# Patient Record
Sex: MALE | Race: BLACK OR AFRICAN AMERICAN | NOT HISPANIC OR LATINO | Employment: FULL TIME | URBAN - METROPOLITAN AREA
[De-identification: names, ages, dates, MRNs, and addresses within clinical notes are randomized per-mention and may not be internally consistent; named-entity substitution may affect disease eponyms.]

---

## 2020-09-08 ENCOUNTER — OCCUPATIONAL HEALTH (OUTPATIENT)
Dept: URGENT CARE | Facility: CLINIC | Age: 20
End: 2020-09-08

## 2020-09-08 DIAGNOSIS — Z02.1 PRE-EMPLOYMENT EXAMINATION: Primary | ICD-10-CM

## 2020-09-08 LAB
CTP QC/QA: YES
POC 10 PANEL DRUG SCREEN: NEGATIVE

## 2020-09-08 PROCEDURE — 80305 POCT RAPID DRUG SCREEN 10 PANEL: ICD-10-PCS | Mod: QW,S$GLB,, | Performed by: FAMILY MEDICINE

## 2020-09-08 PROCEDURE — 99499 UNLISTED E&M SERVICE: CPT | Mod: S$GLB,,, | Performed by: FAMILY MEDICINE

## 2020-09-08 PROCEDURE — 80305 DRUG TEST PRSMV DIR OPT OBS: CPT | Mod: QW,S$GLB,, | Performed by: FAMILY MEDICINE

## 2020-09-08 PROCEDURE — 97750 LIFT TEST: ICD-10-PCS | Mod: S$GLB,,, | Performed by: FAMILY MEDICINE

## 2020-09-08 PROCEDURE — 97750 PHYSICAL PERFORMANCE TEST: CPT | Mod: S$GLB,,, | Performed by: FAMILY MEDICINE

## 2020-09-08 PROCEDURE — 99499 PHYSICAL, BASIC COMPLEXITY: ICD-10-PCS | Mod: S$GLB,,, | Performed by: FAMILY MEDICINE

## 2022-04-08 ENCOUNTER — OFFICE VISIT (OUTPATIENT)
Dept: URGENT CARE | Facility: CLINIC | Age: 22
End: 2022-04-08
Payer: COMMERCIAL

## 2022-04-08 DIAGNOSIS — M79.672 LEFT FOOT PAIN: ICD-10-CM

## 2022-04-08 DIAGNOSIS — Z02.6 ENCOUNTER RELATED TO WORKER'S COMPENSATION CLAIM: Primary | ICD-10-CM

## 2022-04-08 DIAGNOSIS — S97.82XA CRUSHING INJURY OF LEFT FOOT, INITIAL ENCOUNTER: ICD-10-CM

## 2022-04-08 LAB
BREATH ALCOHOL: 0
CTP QC/QA: YES
POC 10 PANEL DRUG SCREEN: NEGATIVE

## 2022-04-08 PROCEDURE — 99203 OFFICE O/P NEW LOW 30 MIN: CPT | Mod: S$GLB,,, | Performed by: FAMILY MEDICINE

## 2022-04-08 PROCEDURE — 80305 DRUG TEST PRSMV DIR OPT OBS: CPT | Mod: S$GLB,,, | Performed by: FAMILY MEDICINE

## 2022-04-08 PROCEDURE — 82075 POCT BREATH ALCOHOL TEST: ICD-10-PCS | Mod: S$GLB,,, | Performed by: FAMILY MEDICINE

## 2022-04-08 PROCEDURE — 99203 PR OFFICE/OUTPT VISIT, NEW, LEVL III, 30-44 MIN: ICD-10-PCS | Mod: S$GLB,,, | Performed by: FAMILY MEDICINE

## 2022-04-08 PROCEDURE — 73630 X-RAY EXAM OF FOOT: CPT | Mod: LT,S$GLB,, | Performed by: RADIOLOGY

## 2022-04-08 PROCEDURE — 82075 ASSAY OF BREATH ETHANOL: CPT | Mod: S$GLB,,, | Performed by: FAMILY MEDICINE

## 2022-04-08 PROCEDURE — 80305 POCT RAPID DRUG SCREEN 10 PANEL: ICD-10-PCS | Mod: S$GLB,,, | Performed by: FAMILY MEDICINE

## 2022-04-08 PROCEDURE — 73630 XR FOOT COMPLETE 3 VIEW LEFT: ICD-10-PCS | Mod: LT,S$GLB,, | Performed by: RADIOLOGY

## 2022-04-08 RX ORDER — NAPROXEN 500 MG/1
500 TABLET ORAL 2 TIMES DAILY WITH MEALS
Qty: 30 TABLET | Refills: 0 | Status: SHIPPED | OUTPATIENT
Start: 2022-04-08 | End: 2023-04-08

## 2022-04-08 NOTE — LETTER
[facility]  [address], [address]  [place] [address]  Phone: [phone]  Fax: [phone]  [facility]: [id number]    Pt Name: Jessica Escobedo  Injury Date: 04/07/2022   Employee ID:[id number] Date of  Treatment: 04/08/2022   Company:  [employer]      Appointment Time: 08:20 AM Arrived: 820   Provider: Bartolo Jackman MD Time Out:920     Office Treatment:   1. Encounter related to worker's compensation claim    2. Left foot pain    3. Crushing injury of left foot, initial encounter      Medications Ordered This Encounter   Medications    naproxen (NAPROSYN) 500 MG tablet      Patient Instructions: Attention not to aggravate affected area (Ice and elevate)    Restrictions: Avoid climbing/kneeling/squatting, No Prolonged standing/walking, No lifting/pushing/pulling more than 25 lbs (may progress as tolerated.)     Return Appointment: 4/21 or sooner if needed

## 2022-04-08 NOTE — PROGRESS NOTES
Subjective:       Patient ID: Jessica Escobedo is a 22 y.o. male.    Chief Complaint: Foot Injury and Foot Pain    Pt presents to urgent care for a w/c initial visit. He works for [employer].  He states that yesterday while talking on the phone he rolled a zakia over his left foot.  He has tried ibuprofen OTC with no relief.  DOI 4/7/2022.  Pain scale- 7.    Foot Injury   The incident occurred 12 to 24 hours ago. The incident occurred at work. There was no injury mechanism. The pain is present in the left foot. The pain is at a severity of 7/10. The pain is severe. The pain has been intermittent since onset. He reports no foreign bodies present. Nothing aggravates the symptoms. He has tried NSAIDs for the symptoms. The treatment provided mild relief.   Foot Pain      ROS     Objective:      Physical Exam  Musculoskeletal:         General: Tenderness and signs of injury present. Normal range of motion.      Left foot: Swelling, tenderness and bony tenderness present.        Feet:              Assessment:       1. Encounter related to worker's compensation claim    2. Left foot pain    3. Crushing injury of left foot, initial encounter        Plan:         Medications Ordered This Encounter   Medications    naproxen (NAPROSYN) 500 MG tablet     Sig: Take 1 tablet (500 mg total) by mouth 2 (two) times daily with meals.     Dispense:  30 tablet     Refill:  0     Patient Instructions: Attention not to aggravate affected area (Ice and elevate)   Restrictions: Avoid climbing/kneeling/squatting, No Prolonged standing/walking, No lifting/pushing/pulling more than 25 lbs (may progress as tolerated.)  No follow-ups on file.

## 2022-04-21 ENCOUNTER — OFFICE VISIT (OUTPATIENT)
Dept: URGENT CARE | Facility: CLINIC | Age: 22
End: 2022-04-21
Payer: COMMERCIAL

## 2022-04-21 VITALS
HEART RATE: 65 BPM | TEMPERATURE: 98 F | WEIGHT: 195 LBS | OXYGEN SATURATION: 98 % | BODY MASS INDEX: 28.88 KG/M2 | DIASTOLIC BLOOD PRESSURE: 85 MMHG | RESPIRATION RATE: 16 BRPM | HEIGHT: 69 IN | SYSTOLIC BLOOD PRESSURE: 123 MMHG

## 2022-04-21 DIAGNOSIS — S97.82XD CRUSHING INJURY OF LEFT FOOT, SUBSEQUENT ENCOUNTER: Primary | ICD-10-CM

## 2022-04-21 PROCEDURE — 99214 PR OFFICE/OUTPT VISIT, EST, LEVL IV, 30-39 MIN: ICD-10-PCS | Mod: S$GLB,,, | Performed by: FAMILY MEDICINE

## 2022-04-21 PROCEDURE — 99214 OFFICE O/P EST MOD 30 MIN: CPT | Mod: S$GLB,,, | Performed by: FAMILY MEDICINE

## 2022-04-21 NOTE — PROGRESS NOTES
Subjective:       Patient ID: Jessica Escobedo is a 22 y.o. male.    Chief Complaint: Foot Injury    4/8/2022- Pt presents to urgent care for a w/c initial visit. He works for [employer].  He states that yesterday while talking on the phone he rolled a zakia over his left foot.  He has tried ibuprofen OTC with no relief.  DOI 4/7/2022.  Pain scale- 7.          4/21/2022- Pt presents to urgent care for a w/c follow up.  He works for [employer].  He states that he still has pain in his left foot every once in a while, the pain is really there when he walks around.  He is taking the naproxen as directed with relief.  Pain scale- 6.    Foot Injury   The incident occurred more than 1 week ago. The incident occurred at work. There was no injury mechanism. The pain is present in the left foot. The pain is at a severity of 6/10. The pain is moderate. The pain has been intermittent since onset. He reports no foreign bodies present. Treatments tried: naproxen. The treatment provided no relief.     ROS     Objective:      Physical Exam  Musculoskeletal:         General: Tenderness and signs of injury present.        Feet:            Able to initiate flexion and extension but inc pain in great toe.     Assessment:       1. Crushing injury of left foot, subsequent encounter        Plan:     pt reports improvement in pain, but still limiting in ability to stand. Advised of expectant course. Return sooner if improved to return to full duty.        Patient Instructions: Daily home exercises/warm soaks   Restrictions:  (continue current work status.)  No follow-ups on file.

## 2022-04-21 NOTE — LETTER
[facility]  [address], [address]  [place] [address]  Phone: [phone]  Fax: [phone]  [facility]: [id number]    Pt Name: Jessica Escobedo  Injury Date: 04/07/2022   Employee ID: [id number] Date of  Treatment: 04/21/2022   Company: [employer]      Appointment Time: 08:15 AM Arrived: 800   Provider: Bartolo Jackman MD Time Out:815     Office Treatment:   1. Crushing injury of left foot, subsequent encounter          Patient Instructions: Daily home exercises/warm soaks    Restrictions:  (continue current work status.)     Return Appointment: 5/5 or sooner if needed

## 2022-04-25 VITALS
TEMPERATURE: 98 F | HEIGHT: 69 IN | RESPIRATION RATE: 16 BRPM | WEIGHT: 195 LBS | DIASTOLIC BLOOD PRESSURE: 83 MMHG | OXYGEN SATURATION: 98 % | HEART RATE: 60 BPM | BODY MASS INDEX: 28.88 KG/M2 | SYSTOLIC BLOOD PRESSURE: 124 MMHG

## 2022-05-05 ENCOUNTER — OFFICE VISIT (OUTPATIENT)
Dept: URGENT CARE | Facility: CLINIC | Age: 22
End: 2022-05-05
Payer: COMMERCIAL

## 2022-05-05 VITALS
WEIGHT: 165 LBS | HEIGHT: 69 IN | BODY MASS INDEX: 24.44 KG/M2 | RESPIRATION RATE: 16 BRPM | DIASTOLIC BLOOD PRESSURE: 66 MMHG | OXYGEN SATURATION: 99 % | SYSTOLIC BLOOD PRESSURE: 110 MMHG | TEMPERATURE: 98 F | HEART RATE: 56 BPM

## 2022-05-05 DIAGNOSIS — S97.82XD CRUSHING INJURY OF LEFT FOOT, SUBSEQUENT ENCOUNTER: Primary | ICD-10-CM

## 2022-05-05 DIAGNOSIS — M79.672 LEFT FOOT PAIN: ICD-10-CM

## 2022-05-05 PROCEDURE — 99214 OFFICE O/P EST MOD 30 MIN: CPT | Mod: S$GLB,,, | Performed by: FAMILY MEDICINE

## 2022-05-05 PROCEDURE — 99214 PR OFFICE/OUTPT VISIT, EST, LEVL IV, 30-39 MIN: ICD-10-PCS | Mod: S$GLB,,, | Performed by: FAMILY MEDICINE

## 2022-05-05 NOTE — PROGRESS NOTES
Subjective:       Patient ID: Jessica Escobedo is a 22 y.o. male.    Chief Complaint: Foot Pain    4/21/2022- Pt presents to urgent care for a w/c follow up.  He works for [employer].  He states that he still has pain in his left foot every once in a while, the pain is really there when he walks around.  He is taking the naproxen as directed with relief.  Pain scale- 6.       Patient's place of employment - [employer]  Patient's job title -    Date of Injury - 4/7/2022  Body part injured - left foot  Current work status per last visit - Avoid climbing/kneeling/squatting, No Prolonged standing/walking, No lifting/pushing/pulling more than 25 lbs (may progress as tolerated.)  Improved, same, or worse - Improved  Pt has been taking naproxen for pain, with some relief.  Pain scale 5/10        Foot Pain  This is a recurrent problem. The current episode started more than 1 month ago. The problem occurs constantly. The problem has been gradually improving. Associated symptoms include numbness. The symptoms are aggravated by walking and standing. He has tried NSAIDs for the symptoms. The treatment provided mild relief.       Neurological: Positive for numbness.        Objective:      Physical Exam    Improved swelling  Improved ROM  Pt able to single leg raise with minimal discomfort.   Assessment:       1. Crushing injury of left foot, subsequent encounter    2. Left foot pain        Plan:       Pt subjective pain level reported seemingly out of proportion to physical exam findings. He has asked specifically to return to work on 5/9.      Patient Instructions: Daily home exercises/warm soaks   Restrictions: Regular Duty, Discharged from Occupational Health  No follow-ups on file.

## 2022-05-05 NOTE — LETTER
[facility]  [address], [address]  [facility] [address]  Phone: [phone]  Fax: [phone]  [facility]: [id number]    Pt Name: Jessica Escobedo  Injury Date: 04/07/2022   Employee ID: [id number] Date of  Treatment: 05/05/2022   Company: [employer]      Appointment Time: 07:45 AM Arrived: 800   Provider: Bartolo Jackman MD Time Out:820     Office Treatment:   1. Crushing injury of left foot, subsequent encounter    2. Left foot pain          Patient Instructions: Daily home exercises/warm soaks    Restrictions: Regular Duty, Discharged from Occupational Health      Return to work 5/9

## 2023-08-03 ENCOUNTER — OFFICE VISIT (OUTPATIENT)
Dept: URGENT CARE | Facility: CLINIC | Age: 23
End: 2023-08-03
Payer: COMMERCIAL

## 2023-08-03 VITALS
DIASTOLIC BLOOD PRESSURE: 75 MMHG | SYSTOLIC BLOOD PRESSURE: 120 MMHG | TEMPERATURE: 98 F | RESPIRATION RATE: 16 BRPM | OXYGEN SATURATION: 98 % | HEART RATE: 50 BPM

## 2023-08-03 DIAGNOSIS — M25.512 ACUTE PAIN OF LEFT SHOULDER: Primary | ICD-10-CM

## 2023-08-03 DIAGNOSIS — Z02.6 ENCOUNTER RELATED TO WORKER'S COMPENSATION CLAIM: ICD-10-CM

## 2023-08-03 PROCEDURE — 73030 XR SHOULDER TRAUMA 3 VIEW LEFT: ICD-10-PCS | Mod: LT,S$GLB,, | Performed by: RADIOLOGY

## 2023-08-03 PROCEDURE — 99213 OFFICE O/P EST LOW 20 MIN: CPT | Mod: S$GLB,,, | Performed by: FAMILY MEDICINE

## 2023-08-03 PROCEDURE — 99213 PR OFFICE/OUTPT VISIT, EST, LEVL III, 20-29 MIN: ICD-10-PCS | Mod: S$GLB,,, | Performed by: FAMILY MEDICINE

## 2023-08-03 PROCEDURE — 73030 X-RAY EXAM OF SHOULDER: CPT | Mod: LT,S$GLB,, | Performed by: RADIOLOGY

## 2023-08-03 RX ORDER — NAPROXEN 500 MG/1
500 TABLET ORAL 2 TIMES DAILY
Qty: 30 TABLET | Refills: 1 | Status: SHIPPED | OUTPATIENT
Start: 2023-08-03

## 2023-08-03 RX ORDER — METHYLPREDNISOLONE 4 MG/1
TABLET ORAL
Qty: 21 EACH | Refills: 0 | Status: SHIPPED | OUTPATIENT
Start: 2023-08-03

## 2023-08-03 NOTE — LETTER
[facility] [place] [address], [address]  [facility] [address]  Phone: [phone]  Fax: [phone]  [facility]: [id number]    Pt Name: Jessica Escobedo  Injury Date: 07/31/2023   Employee ID:[id number] Date of Treatment: 08/03/2023   Company: [employer]       Appointment Time: 11:10 AM Arrived: 1130   Provider: Bartolo Jackman MD Time Out:1225     Office Treatment:   1. Acute pain of left shoulder    2. Encounter related to worker's compensation claim      Medications Ordered This Encounter   Medications    methylPREDNISolone (MEDROL DOSEPACK) 4 mg tablet    naproxen (NAPROSYN) 500 MG tablet      Patient Instructions: Attention not to aggravate affected area, Daily home exercises/warm soaks, Apply ice 24-48 hours then apply heat/warm soaks    Restrictions:  (light duty)     Return Appointment: 8/8 or sooner if needed

## 2023-08-03 NOTE — PROGRESS NOTES
Subjective:      Patient ID: Jessica Escobedo is a 23 y.o. male.    Chief Complaint: Shoulder Injury    WC Initial Visit.  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  Pt has cont to work but pain has increased.  Pt has good ROM but raining it causes pain.  Meds Ibuprofen, tylenol  Pt states that ibuprofen helps a lot but wears off after 3 hrs.          pain 8/10    Shoulder Injury   The incident occurred at work. The left shoulder is affected. The incident occurred more than 1 week ago. Injury mechanism: lifting. The pain does not radiate. The pain is at a severity of 8/10. The pain is severe. The symptoms are aggravated by overhead lifting and movement. He has tried ice and acetaminophen (ibuprofen) for the symptoms. The treatment provided moderate relief.     ROS  Objective:     Physical Exam  Musculoskeletal:         General: Tenderness present.      Left shoulder: Tenderness present. Decreased range of motion.        Arms:       Comments: Positive Neer's Positive Maciel positive empty can positive apprehension test  Decreased strength        Assessment:      1. Acute pain of left shoulder    2. Encounter related to worker's compensation claim      Plan:       Medications Ordered This Encounter   Medications    methylPREDNISolone (MEDROL DOSEPACK) 4 mg tablet     Sig: use as directed     Dispense:  21 each     Refill:  0    naproxen (NAPROSYN) 500 MG tablet     Sig: Take 1 tablet (500 mg total) by mouth 2 (two) times daily.     Dispense:  30 tablet     Refill:  1     Patient Instructions: Attention not to aggravate affected area, Daily home exercises/warm soaks, Apply ice 24-48 hours then apply heat/warm soaks   Restrictions:  (light duty)  No follow-ups on file.

## 2023-08-08 ENCOUNTER — OFFICE VISIT (OUTPATIENT)
Dept: URGENT CARE | Facility: CLINIC | Age: 23
End: 2023-08-08
Payer: COMMERCIAL

## 2023-08-08 VITALS
BODY MASS INDEX: 24.44 KG/M2 | TEMPERATURE: 98 F | WEIGHT: 165 LBS | OXYGEN SATURATION: 97 % | SYSTOLIC BLOOD PRESSURE: 149 MMHG | HEART RATE: 53 BPM | RESPIRATION RATE: 16 BRPM | DIASTOLIC BLOOD PRESSURE: 85 MMHG | HEIGHT: 69 IN

## 2023-08-08 DIAGNOSIS — Z02.6 ENCOUNTER RELATED TO WORKER'S COMPENSATION CLAIM: Primary | ICD-10-CM

## 2023-08-08 DIAGNOSIS — M25.512 ACUTE PAIN OF LEFT SHOULDER: ICD-10-CM

## 2023-08-08 PROCEDURE — 99213 OFFICE O/P EST LOW 20 MIN: CPT | Mod: S$GLB,,, | Performed by: FAMILY MEDICINE

## 2023-08-08 PROCEDURE — 99213 PR OFFICE/OUTPT VISIT, EST, LEVL III, 20-29 MIN: ICD-10-PCS | Mod: S$GLB,,, | Performed by: FAMILY MEDICINE

## 2023-08-08 NOTE — LETTER
[facility] [place] [address], [address]  [place] [address]  Phone: [phone]  Fax: [phone]  [facility]: [id number]    Pt Name: Jessica Escobedo  Injury Date: 07/31/2023   Employee ID:[id number] Date of Treatment: 08/08/2023   Company: [employer]      Appointment Time: 07:45 AM Arrived: 1015   Provider: Bartolo Jackman MD Time Out:1115     Office Treatment:   1. Encounter related to worker's compensation claim    2. Acute pain of left shoulder          Patient Instructions: Attention not to aggravate affected area, Daily home exercises/warm soaks    Restrictions:  (continue current restrictions- advance as tolerated.)     Return Appointment: 8/17 or sooner if needed     You have a work related injury. Medical care and treatment required as a result of a work-related injury  should be focused on restoring functional ability required to meet your daily and work activities and return to work, while striving to restore your health to pre-injury status in so far as is feasible.  If Rx a medication that can be sedating, DO NOT TAKE DURING YOUR WORK DAY.    Some OTC measures to help in recovery(if no allergies to, renal issues or pregnant):  Tylenol 325mg 3x per day  Take Pepcid 20mg BID  If applicable or discussed: Magnesium OTC daily; Topical Voltaren Gel; Lidocaine patches  Massage area if possible  Resting of the injured area  Ice for ankle, wrist or elbow injury  Elevation of the injured area if applicable  Heating pad for muscle injury  Stretching/ROM exercises as described in clinic.   ** BE ADVISED: You should be in regular contact with your W/C  to know the status of your claim and /or (if any)pending referrals**

## 2023-08-08 NOTE — PROGRESS NOTES
Subjective:      Patient ID: Jessica Escobedo is a 23 y.o. male.    Chief Complaint: Shoulder Pain    Pt presents to urgent care for a w/c follow up.  He works for [employer].  He states that his back is still hurting, but not until about 45 minutes after he picks something up.  His left shoulder hurts him in the morning when he wakes up.  Pain scale today- 6. He has been taking ibuprofen for pain with mild relief.         8/3/2023-  Initial Visit.  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  Pt has cont to work but pain has increased.  Pt has good ROM but raining it causes pain.  Meds Ibuprofen, tylenol  Pt states that ibuprofen helps a lot but wears off after 3 hrs.          pain 8/10    Shoulder Pain   The pain is present in the left shoulder. This is a new problem. The current episode started in the past 7 days. There has been a history of trauma. The problem occurs constantly. The problem has been unchanged. The pain is at a severity of 6/10. The pain is moderate. He has tried NSAIDS for the symptoms. The treatment provided no relief.     ROS  Objective:     Physical Exam   Improved ROM with less pain of left shoulder.   Still pain with neers, empty can, vazquez and apprehension testing but improved.   Assessment:      1. Encounter related to worker's compensation claim    2. Acute pain of left shoulder      Plan:   Will see back on 8/17       Patient Instructions: Attention not to aggravate affected area, Daily home exercises/warm soaks   Restrictions:  (continue current restrictions- advance as tolerated.)  No follow-ups on file.

## 2023-08-17 ENCOUNTER — OFFICE VISIT (OUTPATIENT)
Dept: URGENT CARE | Facility: CLINIC | Age: 23
End: 2023-08-17
Payer: COMMERCIAL

## 2023-08-17 VITALS
TEMPERATURE: 98 F | DIASTOLIC BLOOD PRESSURE: 94 MMHG | BODY MASS INDEX: 24.44 KG/M2 | HEART RATE: 60 BPM | RESPIRATION RATE: 18 BRPM | WEIGHT: 165 LBS | HEIGHT: 69 IN | OXYGEN SATURATION: 98 % | SYSTOLIC BLOOD PRESSURE: 141 MMHG

## 2023-08-17 DIAGNOSIS — M25.512 ACUTE PAIN OF LEFT SHOULDER: Primary | ICD-10-CM

## 2023-08-17 PROCEDURE — 99213 OFFICE O/P EST LOW 20 MIN: CPT | Mod: S$GLB,,, | Performed by: FAMILY MEDICINE

## 2023-08-17 PROCEDURE — 99213 PR OFFICE/OUTPT VISIT, EST, LEVL III, 20-29 MIN: ICD-10-PCS | Mod: S$GLB,,, | Performed by: FAMILY MEDICINE

## 2023-08-17 NOTE — PROGRESS NOTES
Subjective:      Patient ID: Jessica Escobedo is a 23 y.o. male.    Chief Complaint: Shoulder Injury    Pt returns for work related injury. States the pain to L shoulder is minor enough to no longer impede on his daily work requirements. Pain is most prominent in the mornings but improves as day progresses. 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 Taking naproxen before bed every night. PS 2/10    8/8/23: Pt presents to urgent care for a w/c follow up.  He works for [employer].  He states that his back is still hurting, but not until about 45 minutes after he picks something up.  His left shoulder hurts him in the morning when he wakes up.  Pain scale today- 6. He has been taking ibuprofen for pain with mild relief.      8/3/2023-  Initial Visit.  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  Pt has cont to work but pain has increased.  Pt has good ROM but raining it causes pain.  Meds Ibuprofen, tylenol  Pt states that ibuprofen helps a lot but wears off after 3 hrs.          pain 8/10    ROS  Objective:     Physical Exam   Improved ROM  Neg empty can  Neg Neers  Neg apprehension testing.   Assessment:      1. Acute pain of left shoulder      Plan:   Sx have improved. Pt ready to return to reg duty.           Restrictions: Regular Duty, Discharged from Occupational Health  No follow-ups on file.

## 2023-08-17 NOTE — LETTER
[facility] - [place] [address], [address]  [facility] [address]  Phone: [phone]  Fax: [phone]  [facility]: [id number]    Pt Name: Jessica Escobedo  Injury Date: 07/31/2023   Employee ID: [id number] Date of  Treatment: 08/17/2023   Company: [employer]      Appointment Time: 11:45 AM Arrived: 1140   Provider: Bartolo Jackman MD Time Out:1230     Office Treatment:   1. Acute pain of left shoulder               Restrictions: Regular Duty, Discharged from Occupational Health          You have a work related injury. Medical care and treatment required as a result of a work-related injury  should be focused on restoring functional ability required to meet your daily and work activities and return to work, while striving to restore your health to pre-injury status in so far as is feasible.  If Rx a medication that can be sedating, DO NOT TAKE DURING YOUR WORK DAY.    Some OTC measures to help in recovery(if no allergies to, renal issues or pregnant):  Tylenol 325mg 3x per day  Ibuprofen 400mg 3x per day OR Aleve regular strength one tablet 2x per day  Take Pepcid 20mg BID  If applicable or discussed: Magnesium OTC daily; Topical Voltaren Gel; Lidocaine patches  Massage area if possible  Resting of the injured area  Ice for ankle, wrist or elbow injury  Elevation of the injured area if applicable  Heating pad for muscle injury  Stretching/ROM exercises as described in clinic.   ** BE ADVISED: You should be in regular contact with your W/C  to know the status of your claim and /or (if any)pending referrals**

## 2024-11-08 ENCOUNTER — OFFICE VISIT (OUTPATIENT)
Dept: URGENT CARE | Facility: CLINIC | Age: 24
End: 2024-11-08
Payer: OTHER GOVERNMENT

## 2024-11-08 VITALS
TEMPERATURE: 98 F | RESPIRATION RATE: 16 BRPM | OXYGEN SATURATION: 97 % | DIASTOLIC BLOOD PRESSURE: 95 MMHG | HEART RATE: 64 BPM | SYSTOLIC BLOOD PRESSURE: 138 MMHG

## 2024-11-08 DIAGNOSIS — R03.0 ELEVATED BLOOD PRESSURE READING: ICD-10-CM

## 2024-11-08 DIAGNOSIS — R09.81 SINUS CONGESTION: ICD-10-CM

## 2024-11-08 DIAGNOSIS — U07.1 COVID-19 VIRUS INFECTION: Primary | ICD-10-CM

## 2024-11-08 LAB
CTP QC/QA: YES
CTP QC/QA: YES
POC MOLECULAR INFLUENZA A AGN: NEGATIVE
POC MOLECULAR INFLUENZA B AGN: NEGATIVE
SARS-COV-2 AG RESP QL IA.RAPID: POSITIVE

## 2024-11-08 NOTE — PROGRESS NOTES
Subjective:      Patient ID: Jessica Escobedo is a 24 y.o. male.    Vitals:  temperature is 98.3 °F (36.8 °C). His blood pressure is 138/95 (abnormal) and his pulse is 64. His respiration is 16 and oxygen saturation is 97%.     Chief Complaint: Fever    Fever   This is a new problem. The current episode started 3 days ago. The problem occurs constantly. The problem has been gradually worsening. He has not experienced a heat injury.His temperature was unmeasured prior to arrival. The temperature was taken using an oral thermometer. Associated symptoms include congestion, coughing, headaches, muscle aches and a sore throat. Pertinent negatives include no abdominal pain, chest pain, diarrhea, ear pain, nausea, not playful when afebrile, rash, sleepiness, urinary pain, vomiting or wheezing. Treatments tried: ibuprofen, nyquil, benadryl. The treatment provided mild relief.       Constitution: Positive for fever. Negative for chills, sweating and fatigue.   HENT:  Positive for congestion, postnasal drip, sinus pressure and sore throat. Negative for ear pain, drooling, nosebleeds, foreign body in nose, sinus pain, trouble swallowing and voice change.    Neck: Negative for neck pain, neck stiffness, painful lymph nodes and neck swelling.   Cardiovascular:  Negative for chest pain, leg swelling, palpitations, sob on exertion and passing out.   Eyes:  Negative for eye discharge, eye itching, eye pain, eye redness and eyelid swelling.   Respiratory:  Positive for cough. Negative for chest tightness, sputum production, bloody sputum, shortness of breath, stridor and wheezing.    Gastrointestinal:  Negative for abdominal pain, abdominal bloating, nausea, vomiting, constipation, diarrhea and heartburn.   Genitourinary:  Negative for dysuria, urine decreased, flank pain, hematuria and pelvic pain.   Musculoskeletal:  Positive for muscle ache. Negative for joint pain, joint swelling, abnormal ROM of joint, back pain, pain with walking  and muscle cramps.   Skin:  Negative for rash and hives.   Allergic/Immunologic: Negative for hives, itching and sneezing.   Neurological:  Positive for headaches. Negative for dizziness, light-headedness, passing out, loss of balance, altered mental status, loss of consciousness, numbness and seizures.   Hematologic/Lymphatic: Negative for swollen lymph nodes.   Psychiatric/Behavioral:  Negative for altered mental status and nervous/anxious. The patient is not nervous/anxious.       Objective:     Physical Exam   Constitutional: He is oriented to person, place, and time. He appears well-developed. He is cooperative.  Non-toxic appearance. He does not appear ill. No distress.   HENT:   Head: Normocephalic and atraumatic.   Ears:   Right Ear: Hearing, tympanic membrane, external ear and ear canal normal.   Left Ear: Hearing, tympanic membrane, external ear and ear canal normal.   Nose: Mucosal edema and rhinorrhea present. No nasal deformity. No epistaxis. Right sinus exhibits no maxillary sinus tenderness and no frontal sinus tenderness. Left sinus exhibits no maxillary sinus tenderness and no frontal sinus tenderness.   Mouth/Throat: Uvula is midline and mucous membranes are normal. No trismus in the jaw. Normal dentition. No uvula swelling. Posterior oropharyngeal erythema and cobblestoning present. No oropharyngeal exudate, posterior oropharyngeal edema or tonsillar abscesses. No tonsillar exudate.   Eyes: Conjunctivae and lids are normal. No scleral icterus.   Neck: Trachea normal and phonation normal. Neck supple. No edema present. No erythema present. No neck rigidity present.   Cardiovascular: Normal rate, regular rhythm, normal heart sounds and normal pulses.   Pulmonary/Chest: Effort normal and breath sounds normal. No accessory muscle usage or stridor. No respiratory distress. He has no decreased breath sounds. He has no wheezes. He has no rhonchi. He has no rales.   Abdominal: Normal appearance.    Musculoskeletal: Normal range of motion.         General: No deformity. Normal range of motion.   Lymphadenopathy:     He has no cervical adenopathy.   Neurological: He is alert and oriented to person, place, and time. He has normal sensation. He exhibits normal muscle tone. Gait normal. Coordination normal.   Skin: Skin is warm, dry, intact, not diaphoretic, not pale and no rash. Capillary refill takes less than 2 seconds.   Psychiatric: His speech is normal and behavior is normal. Judgment and thought content normal.   Nursing note and vitals reviewed.    Results for orders placed or performed in visit on 11/08/24   SARS Coronavirus 2 Antigen, POCT Manual Read    Collection Time: 11/08/24  5:50 PM   Result Value Ref Range    SARS Coronavirus 2 Antigen Positive (A) Negative     Acceptable Yes    POCT Influenza A/B MOLECULAR    Collection Time: 11/08/24  6:04 PM   Result Value Ref Range    POC Molecular Influenza A Ag Negative Negative    POC Molecular Influenza B Ag Negative Negative     Acceptable Yes      Assessment:     1. COVID-19 virus infection    2. Sinus congestion    3. Elevated blood pressure reading        Plan:   Discussed test results done in clinic today with patient. CDC precautions given to patient. Advised close follow-up with PCP and/or Specialist for further evaluation as needed. ER precautions given to patient as well. Patient aware, verbalized understanding and agreed with plan of care.    COVID-19 virus infection    Sinus congestion  -     SARS Coronavirus 2 Antigen, POCT Manual Read  -     POCT Influenza A/B MOLECULAR    Elevated blood pressure reading    There are no Patient Instructions on file for this visit.

## 2024-11-08 NOTE — LETTER
November 8, 2024      [facility] [address]  [facility] [address]  Phone: [phone]  Fax: [phone]       Patient: Jessica Escobedo   YOB: 2000  Date of Visit: 11/08/2024    To Whom It May Concern:    Yamilka Escobedo  was at [facility] on 11/08/2024. Please excuse patient for days missed from work/school. The patient may return to work/school after patient is at least 24 hours fever-free without the use of fever-reducing medications and improvement of symptoms. If you have any questions or concerns, or if I can be of further assistance, please do not hesitate to contact me.    Sincerely,      Natalie Montoya PA-C

## 2024-11-13 ENCOUNTER — PATIENT MESSAGE (OUTPATIENT)
Dept: RESEARCH | Facility: HOSPITAL | Age: 24
End: 2024-11-13
Payer: COMMERCIAL